# Patient Record
Sex: FEMALE
[De-identification: names, ages, dates, MRNs, and addresses within clinical notes are randomized per-mention and may not be internally consistent; named-entity substitution may affect disease eponyms.]

---

## 2020-07-31 PROBLEM — Z00.00 ENCOUNTER FOR PREVENTIVE HEALTH EXAMINATION: Status: ACTIVE | Noted: 2020-07-31

## 2020-08-03 ENCOUNTER — APPOINTMENT (OUTPATIENT)
Dept: OTOLARYNGOLOGY | Facility: CLINIC | Age: 45
End: 2020-08-03
Payer: COMMERCIAL

## 2020-08-03 VITALS — BODY MASS INDEX: 31.1 KG/M2 | TEMPERATURE: 98.1 F | HEIGHT: 69 IN | WEIGHT: 210 LBS

## 2020-08-03 DIAGNOSIS — Z87.09 PERSONAL HISTORY OF OTHER DISEASES OF THE RESPIRATORY SYSTEM: ICD-10-CM

## 2020-08-03 DIAGNOSIS — Z82.49 FAMILY HISTORY OF ISCHEMIC HEART DISEASE AND OTHER DISEASES OF THE CIRCULATORY SYSTEM: ICD-10-CM

## 2020-08-03 PROCEDURE — 92567 TYMPANOMETRY: CPT

## 2020-08-03 PROCEDURE — 92504 EAR MICROSCOPY EXAMINATION: CPT

## 2020-08-03 PROCEDURE — 99214 OFFICE O/P EST MOD 30 MIN: CPT | Mod: 25

## 2020-08-03 PROCEDURE — 92557 COMPREHENSIVE HEARING TEST: CPT

## 2020-08-03 RX ORDER — FEXOFENADINE HCL 60 MG
CAPSULE ORAL
Refills: 0 | Status: ACTIVE | COMMUNITY

## 2020-08-03 NOTE — HISTORY OF PRESENT ILLNESS
[de-identified] : MATTEO GURJIT has a history of hearing loss. CT scan evidence of otosclerosis in the past [FreeTextEntry1] : 08/03/2020 \par Patient reports of decreased hearing. This has worsened over time.  It has interfered with daily communications.  No pain and no otorrhea reported.

## 2020-08-03 NOTE — ASSESSMENT
[FreeTextEntry1] : Progressive hearing loss in the right ear which appears to be consistent with otosclerosis. Cannot rule out a sensory neural component. We discussed this. I have recommended a trial with amplification.

## 2020-08-03 NOTE — PHYSICAL EXAM
[FreeTextEntry1] : Procedure: Microscopic Ear Exam\par \par Left ear:  \par Ear canal intact without inflammation or lesion.  \par Tympanic membrane intact without inflammation.\par \par \par Right ear:  \par Ear canal intact without inflammation or lesion.  \par Tympanic membrane intact and mobile. No inflammation. Red blush noted anterior to the stapes.\par \par [place] Hearing Assessment\par 512 Hz:\par Quiroz test: referred to the bilateral equivocal\par Rinne test:\par 	Right Ear: Air Conduction > Bone Conduction\par 	Left Ear:   Air Conduction > Bone conduction [Normal] : no rashes

## 2021-04-23 ENCOUNTER — PREPPED CHART (OUTPATIENT)
Dept: URBAN - METROPOLITAN AREA CLINIC 92 | Facility: CLINIC | Age: 46
End: 2021-04-23

## 2021-04-23 PROBLEM — H40.013 GLAUCOMA SUSPECT, LOW RISK: Noted: 2021-04-23

## 2021-11-08 ENCOUNTER — APPOINTMENT (OUTPATIENT)
Dept: OTOLARYNGOLOGY | Facility: CLINIC | Age: 46
End: 2021-11-08
Payer: COMMERCIAL

## 2021-11-08 PROCEDURE — 92504 EAR MICROSCOPY EXAMINATION: CPT

## 2021-11-08 PROCEDURE — 99214 OFFICE O/P EST MOD 30 MIN: CPT | Mod: 25

## 2021-11-08 PROCEDURE — 92550 TYMPANOMETRY & REFLEX THRESH: CPT

## 2021-11-08 PROCEDURE — 92557 COMPREHENSIVE HEARING TEST: CPT

## 2021-11-08 NOTE — ASSESSMENT
[FreeTextEntry1] : Increasing hearing loss in the right ear with interference to daily activities. I have reviewed this with the patient in detail. Carefully reviewed. Management options, including, but not limited to, surgical intervention. She is significantly bothered by this. She does not wish to consider amplification.All risks limitations, complications and alternatives reviewed in detail.  Questions answered.\par \par Surgical plan: Right laser stapedotomy, left hand, vein graft

## 2021-11-08 NOTE — CONSULT LETTER
[Please see my note below.] : Please see my note below. [FreeTextEntry2] : Dear YENNI CHIANG  [FreeTextEntry1] : Thank you for allowing me to participate in the care of MATTEO CAGLE .\par Please see the attached visit note.\par \par \par \par Rm Franz\par Otology\par Medical Director of [facility]\par Department of Otolaryngology\par [facility]

## 2021-11-08 NOTE — HISTORY OF PRESENT ILLNESS
[de-identified] : MATTEO GURJIT has a history of hearing loss. CT scan evidence of otosclerosis in the past [FreeTextEntry1] : 11/08/2021 \par Feels that the hearing is worse.  Interferring with daily activity.  Her vertigo reported.

## 2021-11-08 NOTE — DATA REVIEWED
[de-identified] : To investigate perceived hearing loss, Complete audiometry was ordered and completed today. I have interpreted these results and reviewed them in detail with the patient.\par \par  [de-identified] : CT scan report 2018 revealed right-sided otosclerosis

## 2021-11-08 NOTE — PHYSICAL EXAM
[Normal] : temporomandibular joint is normal [FreeTextEntry1] : Procedure: Microscopic Ear Exam\par \par Left ear:  Ear canal intact without inflammation or lesion.  \par Tympanic membrane intact without inflammation.\par \par Right ear:  Ear canal intact without inflammation or lesion.  \par Tympanic membrane intact without inflammation.\par \par [place] Hearing Assessment\par 512 Hz:\par Quiroz test: referred to the right ear\par Rinne test:\par 	Right Ear: Air Conduction = Bone Conduction\par 	Left Ear:   Air Conduction > Bone conduction

## 2022-07-28 ENCOUNTER — APPOINTMENT (OUTPATIENT)
Dept: OTOLARYNGOLOGY | Facility: CLINIC | Age: 47
End: 2022-07-28

## 2022-07-28 VITALS — BODY MASS INDEX: 34.07 KG/M2 | HEIGHT: 69 IN | WEIGHT: 230 LBS | TEMPERATURE: 96.1 F

## 2022-07-28 PROCEDURE — 92504 EAR MICROSCOPY EXAMINATION: CPT

## 2022-07-28 PROCEDURE — 92557 COMPREHENSIVE HEARING TEST: CPT

## 2022-07-28 PROCEDURE — 99213 OFFICE O/P EST LOW 20 MIN: CPT

## 2022-07-28 PROCEDURE — 92567 TYMPANOMETRY: CPT

## 2022-07-28 RX ORDER — ERENUMAB-AOOE 70 MG/ML
70 INJECTION SUBCUTANEOUS
Refills: 0 | Status: DISCONTINUED | COMMUNITY
End: 2022-07-28

## 2022-07-28 NOTE — HISTORY OF PRESENT ILLNESS
[de-identified] : MATTEO GURJIT has a history of hearing loss. CT scan evidence of otosclerosis in the past [FreeTextEntry1] : 07/28/2022 \par no reported change in hearing. Increasing awareness of hearing loss in the right ear. No vertigo.

## 2022-07-28 NOTE — ASSESSMENT
[FreeTextEntry1] : management options reviewed with the patient in detail. She was to proceed with surgical planning for the right ear. All risks limitations, complications and alternatives reviewed in detail.  Questions answered.\par \par surgical plan: Right laser stapedotomy, left hand vein graft

## 2022-07-28 NOTE — DATA REVIEWED
[de-identified] : Complete audiometry was ordered and completed today. I have interpreted these results and reviewed them in detail with the patient.\par \par moderate conductive hearing loss in the right ear.

## 2022-09-15 ENCOUNTER — TRANSCRIPTION ENCOUNTER (OUTPATIENT)
Age: 47
End: 2022-09-15

## 2022-09-15 RX ORDER — ACETAMINOPHEN 500 MG
1000 TABLET ORAL ONCE
Refills: 0 | Status: COMPLETED | OUTPATIENT
Start: 2022-09-16 | End: 2022-09-16

## 2022-09-15 RX ORDER — APREPITANT 80 MG/1
40 CAPSULE ORAL ONCE
Refills: 0 | Status: COMPLETED | OUTPATIENT
Start: 2022-09-16 | End: 2022-09-16

## 2022-09-15 NOTE — ASU PATIENT PROFILE, ADULT - ABILITY TO HEAR (WITH HEARING AID OR HEARING APPLIANCE IF NORMALLY USED):
decreased hearing right ear/Mildly to Moderately Impaired: difficulty hearing in some environments or speaker may need to increase volume or speak distinctly

## 2022-09-15 NOTE — ASU PATIENT PROFILE, ADULT - NS PREOP UNDERSTANDS INFO
No solid food, dairy, candy or gum after 9:30pm tonight, water is allowed before 4:30am tomorrow. Pt. reminded to bring photo ID, vaccination and insurance card. Dress in comfortable clothes, no jewelries, no smoking, alcohol drinking or illicit drug use today. Escort must be atleast 18yrs. Address and call back number provided./yes

## 2022-09-16 ENCOUNTER — APPOINTMENT (OUTPATIENT)
Dept: OTOLARYNGOLOGY | Facility: AMBULATORY SURGERY CENTER | Age: 47
End: 2022-09-16

## 2022-09-16 ENCOUNTER — TRANSCRIPTION ENCOUNTER (OUTPATIENT)
Age: 47
End: 2022-09-16

## 2022-09-16 ENCOUNTER — RESULT REVIEW (OUTPATIENT)
Age: 47
End: 2022-09-16

## 2022-09-16 ENCOUNTER — OUTPATIENT (OUTPATIENT)
Dept: OUTPATIENT SERVICES | Facility: HOSPITAL | Age: 47
LOS: 1 days | Discharge: HOME | End: 2022-09-16

## 2022-09-16 VITALS
WEIGHT: 228.62 LBS | HEART RATE: 56 BPM | DIASTOLIC BLOOD PRESSURE: 73 MMHG | TEMPERATURE: 98 F | OXYGEN SATURATION: 100 % | SYSTOLIC BLOOD PRESSURE: 135 MMHG | HEIGHT: 69 IN | RESPIRATION RATE: 16 BRPM

## 2022-09-16 VITALS
DIASTOLIC BLOOD PRESSURE: 70 MMHG | TEMPERATURE: 98 F | OXYGEN SATURATION: 100 % | RESPIRATION RATE: 18 BRPM | HEART RATE: 67 BPM | SYSTOLIC BLOOD PRESSURE: 131 MMHG

## 2022-09-16 PROCEDURE — 69660 REVISE MIDDLE EAR BONE: CPT | Mod: RT

## 2022-09-16 PROCEDURE — 37799 UNLISTED PX VASCULAR SURGERY: CPT | Mod: LT

## 2022-09-16 PROCEDURE — 95867 NDL EMG CRANIAL NRV MUSC UNI: CPT | Mod: 26

## 2022-09-16 PROCEDURE — 88304 TISSUE EXAM BY PATHOLOGIST: CPT | Mod: 26

## 2022-09-16 DEVICE — IMPLANTABLE DEVICE: Type: IMPLANTABLE DEVICE | Site: RIGHT | Status: FUNCTIONAL

## 2022-09-16 DEVICE — FIBER IMPERIUM 300 MICRON: Type: IMPLANTABLE DEVICE | Site: RIGHT | Status: FUNCTIONAL

## 2022-09-16 RX ORDER — MECLIZINE HCL 12.5 MG
12.5 TABLET ORAL ONCE
Refills: 0 | Status: COMPLETED | OUTPATIENT
Start: 2022-09-16 | End: 2022-09-16

## 2022-09-16 RX ORDER — SODIUM CHLORIDE 9 MG/ML
500 INJECTION, SOLUTION INTRAVENOUS
Refills: 0 | Status: DISCONTINUED | OUTPATIENT
Start: 2022-09-16 | End: 2022-09-16

## 2022-09-16 RX ORDER — FENTANYL CITRATE 50 UG/ML
50 INJECTION INTRAVENOUS
Refills: 0 | Status: DISCONTINUED | OUTPATIENT
Start: 2022-09-16 | End: 2022-09-16

## 2022-09-16 RX ORDER — OXYCODONE HYDROCHLORIDE 5 MG/1
5 TABLET ORAL ONCE
Refills: 0 | Status: DISCONTINUED | OUTPATIENT
Start: 2022-09-16 | End: 2022-09-16

## 2022-09-16 RX ORDER — SCOPALAMINE 1 MG/3D
1 PATCH, EXTENDED RELEASE TRANSDERMAL
Refills: 0 | Status: DISCONTINUED | OUTPATIENT
Start: 2022-09-16 | End: 2022-09-16

## 2022-09-16 RX ORDER — ACETAMINOPHEN 500 MG
1000 TABLET ORAL ONCE
Refills: 0 | Status: COMPLETED | OUTPATIENT
Start: 2022-09-16 | End: 2022-09-16

## 2022-09-16 RX ORDER — HYDROMORPHONE HYDROCHLORIDE 2 MG/ML
0.5 INJECTION INTRAMUSCULAR; INTRAVENOUS; SUBCUTANEOUS
Refills: 0 | Status: DISCONTINUED | OUTPATIENT
Start: 2022-09-16 | End: 2022-09-16

## 2022-09-16 RX ORDER — SODIUM CHLORIDE 9 MG/ML
1000 INJECTION, SOLUTION INTRAVENOUS ONCE
Refills: 0 | Status: DISCONTINUED | OUTPATIENT
Start: 2022-09-16 | End: 2022-09-16

## 2022-09-16 RX ORDER — BENZOCAINE AND MENTHOL 5; 1 G/100ML; G/100ML
1 LIQUID ORAL ONCE
Refills: 0 | Status: COMPLETED | OUTPATIENT
Start: 2022-09-16 | End: 2022-09-16

## 2022-09-16 RX ADMIN — APREPITANT 40 MILLIGRAM(S): 80 CAPSULE ORAL at 07:02

## 2022-09-16 RX ADMIN — BENZOCAINE AND MENTHOL 1 LOZENGE: 5; 1 LIQUID ORAL at 10:38

## 2022-09-16 RX ADMIN — SCOPALAMINE 1 PATCH: 1 PATCH, EXTENDED RELEASE TRANSDERMAL at 11:32

## 2022-09-16 RX ADMIN — Medication 1000 MILLIGRAM(S): at 13:25

## 2022-09-16 RX ADMIN — Medication 12.5 MILLIGRAM(S): at 10:19

## 2022-09-16 RX ADMIN — Medication 400 MILLIGRAM(S): at 13:10

## 2022-09-16 RX ADMIN — Medication 1000 MILLIGRAM(S): at 07:01

## 2022-09-16 NOTE — ASU DISCHARGE PLAN (ADULT/PEDIATRIC) - NS MD DC FALL RISK RISK
For information on Fall & Injury Prevention, visit: [email or web address] OR  [email or web address] OR  https://www.cdc.gov/steadi/patient.html

## 2022-09-16 NOTE — ASU DISCHARGE PLAN (ADULT/PEDIATRIC) - ASU DC SPECIAL INSTRUCTIONSFT
ENT Discharge Instructions    ENT follow up appointment:  - please call the office to confirm appointment    *Please call your doctor or nurse practitioner if you have increased pain, swelling, redness, or drainage from the incision site.  *Avoid swimming and baths until your follow-up appointment.  *Keep the right ear dry  *You may shower, and wash surgical incisions with a mild soap and warm water. Gently pat the area dry.  *If you have steri-strips, they will fall off on their own. Please remove any remaining strips 7-10 days after surgery.    Activity:  - fatigue is common after surgery, rest if you feel tired   - Please get plenty of rest, continue to ambulate several times per day, and drink adequate amounts of fluids.   - Avoid lifting weights greater than 5-10 lbs until you follow-up with your surgeon, who will instruct you further regarding activity restrictions.    Medications: Please resume all regular home medications unless specifically advised not to take a particular medication. Also, please take antibiotics and steroids as prescribed.     Warning Signs:  Please call your doctor or nurse practitioner if you experience the following:  *You experience new chest pain, pressure, squeezing or tightness.  *New or worsening cough, shortness of breath, or wheeze.  *If you are vomiting and cannot keep down fluids or your medications.  *You are getting dehydrated due to continued vomiting, diarrhea, or other reasons. Signs of dehydration include dry mouth, rapid heartbeat, or feeling dizzy or faint when standing.  *Your pain is not improving within 8-12 hours or is not gone within 24 hours.  *You have shaking chills, or fever greater than 101.5 degrees Fahrenheit or 38 degrees Celsius.  *Any change in your symptoms, or any new symptoms that concern you.     PLEASE CALL THE OFFICE WITH ANY QUESTIONS OR CONCERNS

## 2022-09-16 NOTE — PRE-ANESTHESIA EVALUATION ADULT - NSANTHADDINFOFT_GEN_ALL_CORE
Pt accepts all anesthesia risks IBNLT: Dental, Pharyngeal, Laryngeal, Tracheal Trauma, Sore Throat, Hoarseness, Recurrent Laryngeal Nerve Dysfunction, Upper and Lower Extremity Paresthesias, Nausea and Vomiting, Exacerbation of asthma

## 2022-09-22 ENCOUNTER — APPOINTMENT (OUTPATIENT)
Dept: OTOLARYNGOLOGY | Facility: CLINIC | Age: 47
End: 2022-09-22

## 2022-09-22 PROCEDURE — 99024 POSTOP FOLLOW-UP VISIT: CPT

## 2022-09-22 NOTE — HISTORY OF PRESENT ILLNESS
[de-identified] : MATTEO BLAIRDEANA has a history of hearing loss. CT scan evidence of otosclerosis in the past.\par \par 9/16/2022:Right laser stapedotomy, left hand vein graft [FreeTextEntry1] : 09/22/2022 \par History\par post operative visit.\par Reports no significant pain.  No significant tinnitus.  No significant vertigo.  No bleeding reported.\par \par Physical Exam\par \par Face: Normal Function bilaterally\par \par Oral: Normal\par \par Neck: No mass, Full range of motion\par \par Hand: Incision intact, non inflamed\par \par \par Microscopic Ear Exam\par Right Ear:  Ear canal packing removed gently with forceps.  No significant inflammation noted.  The tympanic membrane is intact.\par \par [place] Testing\par 512 Hz:\par Quiroz test: referred to the midline\par \par Wound care instructions were reviewed with the patient in detail. Followup plans discussed.

## 2022-09-29 RX ORDER — TOPIRAMATE 25 MG
75 TABLET ORAL
Qty: 0 | Refills: 0 | DISCHARGE

## 2022-09-29 RX ORDER — FLUTICASONE FUROATE AND VILANTEROL TRIFENATATE 100; 25 UG/1; UG/1
1 POWDER RESPIRATORY (INHALATION)
Qty: 0 | Refills: 0 | DISCHARGE

## 2022-09-29 RX ORDER — FEXOFENADINE HCL 30 MG
1 TABLET ORAL
Qty: 0 | Refills: 0 | DISCHARGE

## 2022-10-27 ENCOUNTER — APPOINTMENT (OUTPATIENT)
Dept: OTOLARYNGOLOGY | Facility: CLINIC | Age: 47
End: 2022-10-27

## 2022-10-27 ENCOUNTER — TRANSCRIPTION ENCOUNTER (OUTPATIENT)
Age: 47
End: 2022-10-27

## 2022-10-27 VITALS — BODY MASS INDEX: 34.07 KG/M2 | TEMPERATURE: 97.3 F | HEIGHT: 69 IN | WEIGHT: 230 LBS

## 2022-10-27 PROBLEM — J30.2 OTHER SEASONAL ALLERGIC RHINITIS: Chronic | Status: ACTIVE | Noted: 2022-09-15

## 2022-10-27 PROBLEM — J45.909 UNSPECIFIED ASTHMA, UNCOMPLICATED: Chronic | Status: ACTIVE | Noted: 2022-09-15

## 2022-10-27 PROBLEM — G43.909 MIGRAINE, UNSPECIFIED, NOT INTRACTABLE, WITHOUT STATUS MIGRAINOSUS: Chronic | Status: ACTIVE | Noted: 2022-09-15

## 2022-10-27 PROCEDURE — 92567 TYMPANOMETRY: CPT

## 2022-10-27 PROCEDURE — 92557 COMPREHENSIVE HEARING TEST: CPT

## 2022-10-27 PROCEDURE — 99024 POSTOP FOLLOW-UP VISIT: CPT

## 2022-10-27 NOTE — HISTORY OF PRESENT ILLNESS
[de-identified] : MATTEO BLAIRDEANA has a history of hearing loss. CT scan evidence of otosclerosis in the past.\par \par 9/16/2022:Right laser stapedotomy, left hand vein graft [FreeTextEntry1] : 10/27/2022 \par History\par post operative visit.\par Reports no significant pain.  No tinnitus.  No vertigo. Hearing has improved\par \par Physical Exam\par \par Hand: Incision intact, non inflamed\par \par Microscopic Ear Exam\par Right Ear:  Ear canal healing well.  The tympanic membrane is intact.\par \par Complete audiometry was ordered and completed today. This was separately reported by the audiologist. The results were reviewed in detail with the patient.\par \par

## 2023-01-26 ENCOUNTER — APPOINTMENT (OUTPATIENT)
Dept: OTOLARYNGOLOGY | Facility: CLINIC | Age: 48
End: 2023-01-26
Payer: COMMERCIAL

## 2023-01-26 VITALS — BODY MASS INDEX: 31.84 KG/M2 | TEMPERATURE: 97.1 F | WEIGHT: 215 LBS | HEIGHT: 69 IN

## 2023-01-26 PROCEDURE — 92567 TYMPANOMETRY: CPT

## 2023-01-26 PROCEDURE — 92557 COMPREHENSIVE HEARING TEST: CPT

## 2023-01-26 PROCEDURE — 99213 OFFICE O/P EST LOW 20 MIN: CPT

## 2023-01-26 PROCEDURE — 92504 EAR MICROSCOPY EXAMINATION: CPT

## 2023-01-26 RX ORDER — CEFADROXIL 500 MG/1
500 CAPSULE ORAL TWICE DAILY
Qty: 4 | Refills: 1 | Status: DISCONTINUED | COMMUNITY
Start: 2022-09-16 | End: 2023-01-26

## 2023-01-26 RX ORDER — METHYLPREDNISOLONE 4 MG/1
4 TABLET ORAL
Qty: 1 | Refills: 0 | Status: DISCONTINUED | COMMUNITY
Start: 2022-09-16 | End: 2023-01-26

## 2023-01-26 NOTE — DATA REVIEWED
[de-identified] : Complete audiometry was ordered and completed today. I have interpreted these results and reviewed them in detail with the patient.\par \par earing within normal limits with air-bone gaps noted in the right ear.Speech recognition intact.

## 2023-01-26 NOTE — ASSESSMENT
[FreeTextEntry1] : Good anatomic result with improved hearing following right stapes surgery in September 2022.  Clinical monitoring recommended.Followup recommended annually and as needed.

## 2023-01-26 NOTE — CONSULT LETTER
[Please see my note below.] : Please see my note below. [FreeTextEntry2] : Dear YENNI CHIANG  [FreeTextEntry1] : Thank you for allowing me to participate in the care of MATTEO CAGLE .\par Please see the attached visit note.\par \par \par \par Rm Franz\par Otology\par Medical Director of [facility]\par Department of Otolaryngology\par [facility]

## 2023-01-26 NOTE — HISTORY OF PRESENT ILLNESS
[de-identified] : MATTEO BLAIRDEANA has a history of hearing loss. CT scan evidence of otosclerosis in the past.\par \par 9/16/2022:Right laser stapedotomy, left hand vein graft [FreeTextEntry1] : 01/26/2023 \par Hearing well. No vertigo. No tinnitus.

## 2023-06-30 ENCOUNTER — ESTABLISHED COMPREHENSIVE EXAM (OUTPATIENT)
Dept: URBAN - METROPOLITAN AREA CLINIC 92 | Facility: CLINIC | Age: 48
End: 2023-06-30

## 2023-06-30 DIAGNOSIS — H25.093: ICD-10-CM

## 2023-06-30 DIAGNOSIS — H40.013: ICD-10-CM

## 2023-06-30 PROCEDURE — 92015 DETERMINE REFRACTIVE STATE: CPT

## 2023-06-30 PROCEDURE — 92014 COMPRE OPH EXAM EST PT 1/>: CPT

## 2023-06-30 ASSESSMENT — KERATOMETRY
OS_AXISANGLE_DEGREES: 165
OS_K1POWER_DIOPTERS: 45.00
OD_AXISANGLE_DEGREES: 30
OS_AXISANGLE2_DEGREES: 75
OD_K1POWER_DIOPTERS: 45.25
OS_K2POWER_DIOPTERS: 47.00
OD_AXISANGLE2_DEGREES: 120
OD_K2POWER_DIOPTERS: 46.00

## 2023-06-30 ASSESSMENT — VISUAL ACUITY
OD_CC: 20/25+2
OS_CC: 20/30

## 2023-06-30 ASSESSMENT — TONOMETRY
OS_IOP_MMHG: 12
OD_IOP_MMHG: 10

## 2024-02-16 NOTE — PRE-ANESTHESIA EVALUATION ADULT - NSANTHOSAYNRD_GEN_A_CORE
Detail Level: Zone No. MARKO screening performed.  STOP BANG Legend: 0-2 = LOW Risk; 3-4 = INTERMEDIATE Risk; 5-8 = HIGH Risk

## 2024-03-04 ENCOUNTER — APPOINTMENT (OUTPATIENT)
Dept: OTOLARYNGOLOGY | Facility: CLINIC | Age: 49
End: 2024-03-04
Payer: COMMERCIAL

## 2024-03-04 DIAGNOSIS — H80.91 UNSPECIFIED OTOSCLEROSIS, RIGHT EAR: ICD-10-CM

## 2024-03-04 DIAGNOSIS — J30.0 VASOMOTOR RHINITIS: ICD-10-CM

## 2024-03-04 DIAGNOSIS — H90.71 MIXED CONDUCTIVE AND SENSORINEURAL HEARING LOSS, UNILATERAL, RIGHT EAR, WITH UNRESTRICTED HEARING ON THE CONTRALATERAL SIDE: ICD-10-CM

## 2024-03-04 DIAGNOSIS — H90.11 CONDUCTIVE HEARING LOSS, UNILATERAL, RIGHT EAR, WITH UNRESTRICTED HEARING ON THE CONTRALATERAL SIDE: ICD-10-CM

## 2024-03-04 PROCEDURE — 92557 COMPREHENSIVE HEARING TEST: CPT

## 2024-03-04 PROCEDURE — 99213 OFFICE O/P EST LOW 20 MIN: CPT | Mod: 25

## 2024-03-04 PROCEDURE — 92567 TYMPANOMETRY: CPT

## 2024-03-04 PROCEDURE — 31231 NASAL ENDOSCOPY DX: CPT

## 2024-03-04 RX ORDER — TOPIRAMATE 50 MG/1
TABLET, COATED ORAL
Refills: 0 | Status: DISCONTINUED | COMMUNITY
End: 2024-03-04

## 2024-03-04 NOTE — PHYSICAL EXAM
[FreeTextEntry1] : Procedure: Microscopic Ear Exam  Left ear:  Ear canal intact without inflammation or lesion.   Tympanic membrane intact without inflammation.  Right ear:  Ear canal intact without inflammation or lesion.   Tympanic membrane intact without inflammation.  [Nasal Endoscopy Performed] : nasal endoscopy was performed, see procedure section for findings [Normal] : no abnormal secretions

## 2024-03-04 NOTE — PROCEDURE
[FreeTextEntry6] : PROCEDURE:  NASAL ENDOSCOPY    Surgeon: Dr. Franz Indication: Chronic Rhinitis Anesthetic: Topical lidocaine Procedure: The patient was placed in a sitting position.  Following application of the topical anesthetic and decongestant, exam was performed with a flexible endoscope.  The following anatomic sites were directly examined bilaterally in a sequential fashion: The scope was introduced in the nasal passage between the middle and inferior turbinates to exam the inferior portion of the middle meatus and the fontanelle, as well as the maxillary ostia. Next, the scope was passed medially and posteriorly to the middle turbinates to examine the sphenoethmoid recess and the superior turbinate region.  Nasopharynx: no masses, choanae patent, no adenoid tissue  The following findings were noted: Mild to Moderate turbinate hypertrophy causing partial airway obstruction.  No purulence or polypoid disease noted.The eustachian tube orifice were noted to be normal  in appearance Bilaterally with mild mucous retention.

## 2024-03-04 NOTE — ASSESSMENT
[FreeTextEntry1] : Symptoms are most suggestive of eustachian tube dysfunction.  This may represent a patulous eustachian tube occurring during exercise.  The integrity of the surgical repair appears to be intact.  We discussed this.  We discussed management strategies.  I have recommended hydration such as nasal spray and oral hydration.  I have recommended early follow-up if symptoms persist or progress.  I have otherwise recommended continued clinical monitoring.

## 2024-03-04 NOTE — HISTORY OF PRESENT ILLNESS
[de-identified] : MATTEO GURJIT has a history of hearing loss. CT scan evidence of otosclerosis in the past. 9/16/2022:Right laser stapedotomy, left hand vein graft   [FreeTextEntry1] : 03/04/2024  Right ear fullness with exercise with resolution usually after minutes to hours.  No autophony. No tinnitus. occasional dizziness noted while exercising On the treadmill. History of allergic rhinitis.

## 2024-03-04 NOTE — DATA REVIEWED
[de-identified] : In light of the patients current symptoms, Complete audiometry was ordered and completed today. I have interpreted these results and reviewed them in detail with the patient.  Hearing is within normal limits bilaterally through 2000 Hz with a sloping high-frequency hearing loss affecting the right ear greater than the left.  Speech recognition intact bilaterally.  Tympanometry normal bilaterally.

## 2024-03-05 PROBLEM — H90.11 CONDUCTIVE HEARING LOSS OF RIGHT EAR WITH UNRESTRICTED HEARING OF LEFT EAR: Status: ACTIVE | Noted: 2022-07-28

## 2025-01-13 NOTE — ASU PATIENT PROFILE, ADULT - AS [place] [facility]RICTION
[Time Spent: ___ minutes] : I have spent [unfilled] minutes of time on the encounter which excludes teaching and separately reported services. (3) no apparent problem

## (undated) DEVICE — DRAIN PENROSE 0.5X12" SILICONE

## (undated) DEVICE — BUR GRACE MEDICAL DIAMOND ROUND 0.7X95MM

## (undated) DEVICE — BLADE SCALPEL SAFETY #15 WITH PLASTIC GREEN HANDLE

## (undated) DEVICE — SUT PLAIN GUT FAST ABSORBING 5-0 PC-1

## (undated) DEVICE — SUT VICRYL 3-0 18" PS-2 UNDYED

## (undated) DEVICE — PACK MASTOID/MIDDLE EAR

## (undated) DEVICE — VENODYNE/SCD SLEEVE CALF MEDIUM

## (undated) DEVICE — SUT SILK 3-0 18" TIES

## (undated) DEVICE — PREP ALCOHOL PAD MED

## (undated) DEVICE — DRAPE VARI-LENS2 MICROSCPOPE 68MM

## (undated) DEVICE — DRSG XEROFORM 1 X 8"

## (undated) DEVICE — BUR GRACE MEDICAL DIAMOND ROUND 0.06X95MM

## (undated) DEVICE — DRSG MEROCEL STANDARD W STRING 8CM

## (undated) DEVICE — DURABLE MEDICAL EQUIPMENT: Type: DURABLE MEDICAL EQUIPMENT

## (undated) DEVICE — SUT MONOCRYL 3-0 27" PS-2 UNDYED

## (undated) DEVICE — DRSG STERISTRIPS 0.25 X 4"

## (undated) DEVICE — DRSG MASTISOL

## (undated) DEVICE — ELCTR NDL SUBDERMAL 2 CHANNEL

## (undated) DEVICE — DRSG GAUZE SPONGE 2X2" STERILE

## (undated) DEVICE — PETRI DISH MED 3.5"

## (undated) DEVICE — DRAPE APERTURE

## (undated) DEVICE — BUR GRACE MEDICAL DIAMOND ROUND 1X95MM

## (undated) DEVICE — SUT MONOCRYL 5-0 18" P-1 UNDYED

## (undated) DEVICE — GOWN SLEEVES

## (undated) DEVICE — CATH IV SAFE INSYTE 18G X 1.88" (GREEN)

## (undated) DEVICE — GLV 7.5 PROTEXIS (WHITE)

## (undated) DEVICE — BLADE TYMPANOPLASTY 2.5MM W 60 DEGREE BEVEL DOWN

## (undated) DEVICE — BLADE BEAVER MINI MIS 6400

## (undated) DEVICE — PROBE INCREMT PRASS STD TIP

## (undated) DEVICE — COTTONBALL LG

## (undated) DEVICE — DRSG TEGADERM 2.5X3"

## (undated) DEVICE — DRSG STRIP OTOSILK OTOLOGICAL

## (undated) DEVICE — DRAPE STERI-DRAPE MEDIUM W APERTURE

## (undated) DEVICE — DRSG GLASSOCK ADULT

## (undated) DEVICE — WARMING BLANKET FULL ADULT

## (undated) DEVICE — SUT VICRYL 3-0 18" TIES UNDYED

## (undated) DEVICE — BUR GRACE MEDICAL DIAMOND ROUND 0.8X95MM

## (undated) DEVICE — DRSG STOCKINETTE 6"

## (undated) DEVICE — BEAVER BLADE MIN-BLADE ROUNDED TIP 1-SIDE SHARP (GREEN)

## (undated) DEVICE — NDL BLD BEAVER CUTTING EDGE 3.0MM

## (undated) DEVICE — BUR GRACE MEDICAL DIAMOND ROUND 1.4X95MM

## (undated) DEVICE — NDL SPINAL 25G X 3.5" (BLUE)

## (undated) DEVICE — KNIFE ALCON CRESCENT ANGLED BEVEL UP 2.3MM (PINK)

## (undated) DEVICE — DRAPE MICROSCOPE ZEISS

## (undated) DEVICE — KNIFE ALCON MVR V-LANCE 23G (BLACK)